# Patient Record
Sex: MALE | Race: WHITE | Employment: STUDENT | URBAN - METROPOLITAN AREA
[De-identification: names, ages, dates, MRNs, and addresses within clinical notes are randomized per-mention and may not be internally consistent; named-entity substitution may affect disease eponyms.]

---
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
## 2022-07-24 ENCOUNTER — HOSPITAL ENCOUNTER (OUTPATIENT)
Age: 22
Discharge: HOME | End: 2022-07-24
Attending: EMERGENCY MEDICINE
Payer: COMMERCIAL

## 2022-07-24 VITALS
DIASTOLIC BLOOD PRESSURE: 65 MMHG | RESPIRATION RATE: 16 BRPM | OXYGEN SATURATION: 100 % | HEART RATE: 45 BPM | SYSTOLIC BLOOD PRESSURE: 101 MMHG | TEMPERATURE: 98 F

## 2022-07-24 DIAGNOSIS — Z20.2 EXPOSURE TO STD: Primary | ICD-10-CM

## 2022-07-24 LAB
BILIRUB UR QL STRIP: NEGATIVE
CLARITY UR: CLEAR
GLUCOSE UR STRIP-MCNC: NEGATIVE MG/DL
HGB UR QL STRIP: NEGATIVE
KETONES UR STRIP-MCNC: NEGATIVE MG/DL
LEUKOCYTE ESTERASE UR QL STRIP: NEGATIVE
NITRITE UR QL STRIP: NEGATIVE
PH UR STRIP: 6 [PH]
PROT UR STRIP-MCNC: NEGATIVE MG/DL
SP GR UR STRIP: 1.02
UROBILINOGEN UR STRIP-ACNC: <2 MG/DL

## 2022-07-24 PROCEDURE — 99204 OFFICE O/P NEW MOD 45 MIN: CPT

## 2022-07-24 PROCEDURE — 87591 N.GONORRHOEAE DNA AMP PROB: CPT | Performed by: EMERGENCY MEDICINE

## 2022-07-24 PROCEDURE — 81002 URINALYSIS NONAUTO W/O SCOPE: CPT

## 2022-07-24 PROCEDURE — 87491 CHLMYD TRACH DNA AMP PROBE: CPT | Performed by: EMERGENCY MEDICINE

## 2022-07-24 PROCEDURE — 99203 OFFICE O/P NEW LOW 30 MIN: CPT

## 2022-07-26 LAB
C TRACH DNA SPEC QL NAA+PROBE: NEGATIVE
N GONORRHOEA DNA SPEC QL NAA+PROBE: NEGATIVE

## 2024-05-08 ENCOUNTER — E-VISIT (OUTPATIENT)
Dept: TELEHEALTH | Age: 24
End: 2024-05-08
Payer: COMMERCIAL

## 2024-05-08 DIAGNOSIS — R05.9 COUGH, UNSPECIFIED TYPE: Primary | ICD-10-CM

## 2024-05-08 DIAGNOSIS — R06.2 WHEEZING: ICD-10-CM

## 2024-05-08 DIAGNOSIS — R06.89 DIFFICULTY BREATHING: ICD-10-CM

## 2024-05-09 NOTE — PROGRESS NOTES
Katie Cohen is a 23 year old male submitting e-visit for cough, wheezing, difficulty breating.  HPI:   See answers to questionnaire and [facility]PS message exchange  Per med rec - on 3/26/24 was prescribed z-pack and albuterol inhaler; unable to view visit  Reports treated for bronchitis in mid April and symptoms have not improved    Current Outpatient Medications   Medication Sig Dispense Refill    ESCITALOPRAM 10 MG Oral Tab TAKE 1 TABLET BY MOUTH EVERY DAY 90 tablet 0      Past Medical History:    CHICKEN POX    per Mom      No past surgical history on file.   Family History   Problem Relation Age of Onset    Other (Migraines) Mother     Diabetes Other         mggm    High Cholesterol Maternal Grandmother     High Blood Pressure Maternal Grandmother     High Cholesterol Maternal Grandfather     Other (Early Death) Other         mggf, MI age 30's      Social History:  Social History     Socioeconomic History    Marital status: Single   Tobacco Use    Smoking status: Never    Smokeless tobacco: Never   Vaping Use    Vaping status: Some Days   Substance and Sexual Activity    Alcohol use: Yes     Alcohol/week: 0.0 standard drinks of alcohol    Drug use: Yes     Types: Cannabis         ASSESSMENT AND PLAN:       Diagnoses and all orders for this visit:    Cough, unspecified type    Wheezing    Difficulty breathing        After reviewing questionnaire, a higher level of care was recommended d/t limitations of telehealth.   Reports treated for bronchitis in mid April and symptoms have not improved.   ICs closed for the night.   Referred to ED tonight if pt has moderate or severe symptoms; to IC tomorrow if symptoms are mild.    Refer to [email or web address] exchange for specific patient instructions      Duration of  the service:  7 minutes

## 2024-05-13 ENCOUNTER — HOSPITAL ENCOUNTER (OUTPATIENT)
Age: 24
Discharge: HOME | End: 2024-05-13

## 2024-05-13 ENCOUNTER — APPOINTMENT (OUTPATIENT)
Dept: GENERAL RADIOLOGY | Age: 24
End: 2024-05-13

## 2024-05-13 VITALS
DIASTOLIC BLOOD PRESSURE: 53 MMHG | SYSTOLIC BLOOD PRESSURE: 120 MMHG | OXYGEN SATURATION: 100 % | HEART RATE: 82 BPM | RESPIRATION RATE: 18 BRPM | TEMPERATURE: 98 F

## 2024-05-13 DIAGNOSIS — R05.1 ACUTE COUGH: ICD-10-CM

## 2024-05-13 DIAGNOSIS — R05.8 POST-VIRAL COUGH SYNDROME: Primary | ICD-10-CM

## 2024-05-13 PROCEDURE — 71046 X-RAY EXAM CHEST 2 VIEWS: CPT

## 2024-05-13 PROCEDURE — 99213 OFFICE O/P EST LOW 20 MIN: CPT

## 2024-05-13 RX ORDER — BENZONATATE 100 MG/1
100 CAPSULE ORAL 3 TIMES DAILY PRN
Qty: 30 CAPSULE | Refills: 0 | Status: SHIPPED | OUTPATIENT
Start: 2024-05-13 | End: 2024-06-12

## 2024-05-13 NOTE — ED PROVIDER NOTES
Patient Seen in: [facility]      History     Chief Complaint   Patient presents with    Cough     Stated Complaint: excessive coughing, fatigue    Subjective:   Katie is a 23-year-old male presenting to the immediate care complaining of a cough and postnasal drip.  Patient states that for the past month he has had a cough.  He was treated for bronchitis with an antibiotic about 3 weeks ago and still has a lingering cough so he came in for evaluation today.  Patient states that he had a fever at the beginning of illness a month ago.  Has not had any subsequent fever.  Denies any chest pain, shortness of breath, abdominal pain or vomiting.  He is eating and drinking well and is well-hydrated.  He denies any other concerns or complaints.          Objective:   Past Medical History:    CHICKEN POX    per Mom              History reviewed. No pertinent surgical history.             Social History     Socioeconomic History    Marital status: Single   Tobacco Use    Smoking status: Never    Smokeless tobacco: Never   Vaping Use    Vaping status: Some Days   Substance and Sexual Activity    Alcohol use: Yes     Alcohol/week: 0.0 standard drinks of alcohol    Drug use: Yes     Types: Cannabis              Review of Systems    Positive for stated complaint: excessive coughing, fatigue  Other systems are as noted in HPI.  Constitutional and vital signs reviewed.      All other systems reviewed and negative except as noted above.    Physical Exam     ED Triage Vitals [05/13/24 1713]   /53   Pulse 82   Resp 18   Temp 98 °F (36.7 °C)   Temp src Temporal   SpO2 100 %   O2 Device None (Room air)       Current Vitals:   Vital Signs  BP: 120/53  Pulse: 82  Resp: 18  Temp: 98 °F (36.7 °C)  Temp src: Temporal    Oxygen Therapy  SpO2: 100 %  O2 Device: None (Room air)            Physical Exam  Vitals and nursing note reviewed.   Constitutional:       General: He is not in acute distress.     Appearance: Normal  appearance. He is not ill-appearing, toxic-appearing or diaphoretic.   HENT:      Head: Normocephalic.      Right Ear: Tympanic membrane, ear canal and external ear normal.      Left Ear: Tympanic membrane, ear canal and external ear normal.      Nose: Congestion present.      Mouth/Throat:      Mouth: Mucous membranes are moist.      Pharynx: Oropharynx is clear.   Eyes:      Conjunctiva/sclera: Conjunctivae normal.   Cardiovascular:      Rate and Rhythm: Normal rate and regular rhythm.      Pulses: Normal pulses.      Heart sounds: Normal heart sounds.   Pulmonary:      Effort: Pulmonary effort is normal. No tachypnea, bradypnea, accessory muscle usage, prolonged expiration, respiratory distress or retractions.      Breath sounds: Normal breath sounds and air entry. No stridor, decreased air movement or transmitted upper airway sounds. No decreased breath sounds, wheezing, rhonchi or rales.   Abdominal:      General: Abdomen is flat.   Musculoskeletal:         General: Normal range of motion.      Cervical back: Normal range of motion.   Skin:     General: Skin is warm.      Capillary Refill: Capillary refill takes less than 2 seconds.   Neurological:      General: No focal deficit present.      Mental Status: He is alert and oriented to person, place, and time.   Psychiatric:         Mood and Affect: Mood normal.         Behavior: Behavior normal.         Thought Content: Thought content normal.         Judgment: Judgment normal.              ED Course   Labs Reviewed - No data to display    XR CHEST PA + LAT CHEST (CPT=71046)    Result Date: 5/13/2024  CONCLUSION: Normal examination.     Dictated by (CST): Pete Pratt MD on 5/13/2024 at 5:36 PM     Finalized by (CST): Pete Pratt MD on 5/13/2024 at 5:36 PM                [facility]             Medical Decision Making  Multiple medical diagnoses were considered including but not limited to viral versus bacterial etiology of upper respiratory complaints.  Patient  is well appearing, non-toxic and in no acute distress.  Vital signs are stable.   Chest x-ray was negative for pneumonia.  Given the duration of illness there is no utility in COVID or influenza testing.  There are no signs of infection on physical exam.  History and physical exam are consistent with post viral cough illness.  Sent the patient a prescription for Tessalon Perles for the cough.  Recommended that patient drink plenty of fluids, use Tylenol and Motrin for pain or fever, use Flonase for nasal congestion and may use over-the-counter medications like Claritin or Zyrtec for congestion as needed.  Recommended that if the patient develops any chest pain, respiratory complaints, fever that does not improve with medications or any other concerning complaints they should go to the emergency department.    ED precautions discussed.  Patient (guardian) advised to follow up with PCP in 2-3 days.  Patient (guardian) agrees with this plan of care.  Patient (guardian) verbalizes understanding of discharge instructions and plan of care.      Amount and/or Complexity of Data Reviewed  Radiology: ordered and independent interpretation performed. Decision-making details documented in ED Course.    Risk  OTC drugs.  Prescription drug management.        Disposition and Plan     Clinical Impression:  1. Post-viral cough syndrome    2. Acute cough         Disposition:  Discharge  5/13/2024  6:00 pm    Follow-up:  Inocente De La Rosa MD  [address] [place] [address] [id number] [phone]                Medications Prescribed:  Current Discharge Medication List        START taking these medications    Details   benzonatate 100 MG Oral Cap Take 1 capsule (100 mg total) by mouth 3 (three) times daily as needed for cough.  Qty: 30 capsule, Refills: 0

## 2024-05-13 NOTE — DISCHARGE INSTRUCTIONS
Your chest x-ray did not show any signs of pneumonia.  There are no signs of infection on physical exam.  This is likely a post viral cough illness.  I sent you a prescription for Tessalon Perles for your cough.  Please be sure to drink plenty of fluids, use Tylenol and Motrin for pain or fever.  Use Flonase and Mucinex for congestion.  You can also take a Claritin or Zyrtec every morning.  If you develop any respiratory complaints, fever that does not improve with medications or any other concerning complaints you should go to the emergency department. Otherwise follow up with your primary care provider.

## 2024-05-13 NOTE — ED INITIAL ASSESSMENT (HPI)
Pt presents with cough and mild congestion with fatigue x 1 month. Pt reports SOB and fatigued.     Pt diagnosed with bronchitis - given antibiotics and albuterol inhaler.     \"Coughing a little better at times, continued SOB\", per pt.